# Patient Record
Sex: FEMALE | Race: WHITE
[De-identification: names, ages, dates, MRNs, and addresses within clinical notes are randomized per-mention and may not be internally consistent; named-entity substitution may affect disease eponyms.]

---

## 2017-10-21 NOTE — RAD
PORTABLE CHEST:

10/21/17

 

HISTORY: 

Chest pain and shortness of breath. 

 

Heart size and mediastinum are within normal limits. The lungs are clear of infiltrates. No bony fin
dings. 

 

IMPRESSION:  

No active intrathoracic disease. 

 

POS: [facility]

## 2017-10-23 NOTE — OP
DATE OF PROCEDURE:  10/23/2017

 

PROCEDURE:  Endoscopic retrograde cholangiopancreatography with sphincterotomy.

 

PREOPERATIVE DIAGNOSIS:  Choledocholithiasis.

 

OPERATIVE NOTE:  Informed consent was obtained from the patient.  She was sedated with general anest
hesia.  She underwent laparoscopic cholecystectomy.  Intraoperative cholangiogram showed a dilated b
ile duct that did not drain.  This procedure was performed in the same anesthesia as the cholecystec
roni.  The patient was placed in the prone position.  The side-viewing endoscope was advanced easily
 to the second portion of the duodenum.  The ampulla was identified and there was a small periampull
emily diverticulum present.  The common bile duct was cannulated easily with a sphincterotome loaded w
ith a jag wire.  Cholangiogram was performed, which showed a dilated common bile duct and mildly dil
ated extrahepatic ducts.  No focal filling defect was identified.  A large sphincterotomy was perfor
med.  A 15 mm balloon was then used to sweep the bile duct.  The balloon had to be deflated very sli
ghtly between 12 and 15 mm to pass through the sphincterotomy; however, it did pass through easily. 
 The duct was swept clear and occlusion cholangiogram confirmed the duct to be clear.  No stone was 
identified.

 

IMPRESSION:

1.  Dilated common bile duct and extrahepatic ducts.  No focal filling defect was seen.

2.  Large sphincterotomy performed.  Balloon sweep of the bile duct was done and occlusion cholangio
gram confirmed the duct to be clear.  Air was suctioned from the stomach and the patient tolerated t
he procedure well without immediate complications.  Indomethacin 100 mg was given rectally immediate
ly prior to the ERCP.

 

RECOMMENDATIONS:  Follow trend of the liver function test tomorrow.  If she is doing well without co
mplication, she should be able to discharge home tomorrow.

## 2017-10-23 NOTE — CON
DATE OF CONSULTATION:  10/23/2017

 

GASTROENTEROLOGY CONSULTATION NOTE

 

CHIEF COMPLAINT:  Abdominal pain.

 

HISTORY OF PRESENT ILLNESS:  Ms. Bedolla is a 21-year-old woman who delivered a baby 4 weeks ago who
 presented to the emergency room with abdominal pain starting the day before yesterday.  She has rig
ht upper quadrant, sharp to aching pain that radiates through to her right back associated with naus
ea and vomiting.  She came to the emergency room on 10/22/2017 and her pain resolved and she was dis
charged home.  She had more back pain at that time with nausea and vomiting.  She came back yesterda
y to the emergency room and an ultrasound was performed which showed cholelithiasis.  Her common valencia
e duct was noted to be dilated to 1 cm.  She was also noted to have elevated liver tests.  She was a
dmitted to Dr. Pino and GI was consulted to evaluate for choledocholithiasis.  Her pain actually 
resolved around 1:30 this morning; however, her liver tests increased.  She has had no fever or chil
ls.  Her white count has been normal.

 

PAST MEDICAL HISTORY:  Otherwise, negative.  She delivered a healthy baby 4 weeks ago.

 

PAST SURGICAL HISTORY:  Negative.

 

FAMILY HISTORY:  Negative for GI malignancy.  Her grandmother had breast cancer.

 

SOCIAL HISTORY:  No alcohol or drugs.  She smokes 1-2 cigarettes a day.

 

ALLERGIES:  No known drug allergies.

 

MEDICATIONS AT HOME:  None.

 

REVIEW OF SYSTEMS:  Negative x10 systems review except as stated in the history of present illness.

 

PHYSICAL EXAMINATION:

VITAL SIGNS:  Temperature 98.8, pulse 88, and blood pressure 120/84.

GENERAL:  She is in no acute distress, alert and oriented x3.

HEENT:  Eyes have no scleral icterus.  Oropharynx is clear without lesions.

NECK:  No cervical or supraclavicular lymphadenopathy.

LUNGS:  Clear to auscultation bilaterally.

HEART:  Regular rate and rhythm.

ABDOMEN:  Has mild tenderness in the right upper quadrant without guarding.

EXTREMITIES:  No lower extremity edema.

NEUROLOGIC:  Cranial nerves are grossly intact.

 

LABORATORY DATA:  White blood cell count 5.6, hemoglobin 13.2, and platelets 244.  Creatinine 0.79, 
bilirubin is 2.4, , , alkaline phosphatase 109, albumin 4.0, and lipase 24.

 

IMPRESSION:  Choledocholithiasis.  Her liver tests have increased; however, pain is improved.  Her b
ile duct is dilated and she has stones in the gallbladder by ultrasound.  Risks and benefits of endo
scopic retrograde cholangiopancreatography were discussed with the patient in detail, this includes 
post-endoscopic retrograde cholangiopancreatography and bleeding.

 

PLAN:  ERCP today.  Thus will follow laparoscopic cholecystectomy.

## 2017-10-23 NOTE — ULT
PRELIMINARY REPORT/[facility]/EMERGENCY AFTER

HOURS PROCEDURE:

 

EXAM:

US Abdomen Limited, Right Upper Quadrant

 

EXAM DATE/TIME:

Exam ordered 10/23/2017 2:55 AM

 

CLINICAL HISTORY:

21 years old, female; Pain; Other: Ruq

 

TECHNIQUE:

Real-time ultrasound of the right upper quadrant with image documentation.

 

COMPARISON:

No relevant prior studies available.

 

FINDINGS:

Liver: Unremarkable. No mass. No intrahepatic bile duct dilation.

Gallbladder: Cholelithiasis. Gallbladder is distended. Gallbladder wall is at upper limits of normal
 in thickness at 3 mm. Sonographic Ng sign reported as "slightly positive."

Common bile duct: Common bile duct is dilated at 1 cm in caliber. No stones.

Pancreas: Unremarkable as visualized.

Right kidney: Unremarkable. No stones. No solid mass. No hydronephrosis.

 

IMPRESSION:

1. Cholelithiasis. Gallbladder is distended with wall thickness at the upper limits of normal at 3 m
m, and "slightly positive" sonographic Ng's are suspicious for but not definitely diagnostic of 
acute

cholecystitis.

2. Common bile duct is dilated at 1 cm. Distal CBD obstruction not excluded. Consider CT abdomen/pel
vis with intravenous contrast.

 

Thank you for allowing us to participate in the care of your patient.

 

Dictated and Authenticated by: Jefferson Hannah MD

10/23/2017 3:30 AM Central Time ([place] \T\ Rl)

 

 

 

FINAL REPORT

 

EMERGENT AFTER HOURS STUDY

 

ULTRASOUND ABDOMEN LIMITED:

(RIGHT UPPER QUADRANT)

 

HISTORY:

A 21-year-old female with right upper quadrant abdominal pain.

 

FINDINGS:

Gallbladder:  Wall thickness at upper limits of normal, 3 mm.  Several tiny mobile gallstones, a few
 millimeters in size each.  Mildly distended lumen.

Common duct:  Dilated to 10 mm.

Liver:  Diffusely heterogeneous echotexture, suggestive of possible hepatocellular disease.

Pancreas:  Nonspecific sonographic appearance.

Right kidney:  No hydronephrosis.  Parenchyma thin, atypical for age 21 years.

 

No major disagreement with preliminary report by Reinier.

 

IMPRESSION:

1.  Dilated common duct to 10 mm caliber:  Evidence for common bile duct obstruction, such as that d
ue to occult choledocholithiasis.

 

2.  Cholelithiasis.

 

3.  Heterogeneous hepatic echotexture, atypical for stated age of 21 years.  This is suggestive of p
ossible hepatocellular disease.  Recommend correlation with liver enzymes.

 

4.  The right renal parenchyma appears thin, which is also atypical for age 21 years old.  Recommend
 correlation with urinalysis and glomerular filtration rate.

 

PATRICIA ARAGON

 

POS: [facility]

## 2017-10-23 NOTE — RAD
CHOLANGIOGRAM IN SURGERY:

 

History: Cholelithiasis. 

 

FINDINGS/IMPRESSION: 

Two spot fluoroscopic intraoperative images during a cholangiogram demonstrate opacification of the 
common duct, cystic duct, and branches without filling defects. Contrast was not seen in the distal 
common bile duct, pancreatic duct, or second portion of the duodenum. 

 

POS: [facility]

## 2017-10-24 NOTE — PRG
DATE OF SERVICE:  10/24/2017

 

SUBJECTIVE:  Ms. Bedolla still has had abdominal pain.  Her pain is different now.  She has more of 
tightness across epigastric region.  This is not as severe as when she came in, but she is still req
uiring pain medication.  She vomited once this morning, but otherwise is tolerating clear liquids.  
She is ambulatory.

 

PHYSICAL EXAMINATION:

VITAL SIGNS:  Temperature 98.5, pulse 80, and blood pressure 155/84.

GENERAL:  She is in no acute distress, alert and oriented x3.

LUNGS:  Clear to auscultation bilaterally.

HEART:  Regular rate and rhythm without murmur.

ABDOMEN:  Soft, mild tenderness in the epigastric region without guarding.  Bowel sounds are present
.

EXTREMITIES:  No lower extremity edema.

 

LABORATORY DATA:  White blood cell count 10.6, hemoglobin 13.7, platelets 233, creatinine 0.79, bili
hatch 0.9, , , and alkaline phosphatase 106.

 

IMPRESSION:

1.  Choledocholithiasis/cholecystitis, status post cholecystectomy and endoscopic retrograde cholang
iopancreatography with sphincterotomy.  There is no ongoing retained stone at the time of the endosc
opic retrograde cholangiopancreatography.  The ducts were significantly dilated and large sphinctero
roni was performed and the duct was swept and confirmed to be clear.

2.  Epigastric pain.  She might have a mild post-endoscopic retrograde cholangiopancreatography panc
reatitis now.

 

RECOMMENDATIONS:

1.  Continue a clear liquid diet.

2.  Check lipase.

3.  She can likely advance her diet and discharge home tomorrow if she does have pancreatitis, it ap
pears to be very mild.

## 2017-10-24 NOTE — RAD
ERCP:

 

HISTORY:  

Recent cholecystectomy. 

 

FINDINGS/IMPRESSION: 

Four spot fluoroscopic intraoperative images from an ERCP demonstrate opacification of the common bi
le duct, hepatic ducts, and some of the branches. No persistent filling defects are seen. The patien
t is post cholecystectomy. There is some contrast in the cystic duct remnant and the second portion 
of the duodenum. 

 

 

POS: [facility]

## 2017-10-25 NOTE — PRG
DATE OF SERVICE:  10/25/2017

 

SUBJECTIVE:  Ms. Bedolla is feeling better today.  Her abdominal pain has diminished.  She is not na
useated.  She has not tried eating today.  Her labs are still pending.  Her vital signs are normal.

 

ASSESSMENT:  Cholecystitis and choledocholithiasis status post laparoscopic cholecystectomy and endo
scopic retrograde cholangiopancreatography.  She had a mild post-endoscopic retrograde cholangiopanc
reatography pancreatitis, which appears to be resolving.  Her nurses to call me with her labs when t
hey have return and if these are normal we will likely advance her diet.  I anticipate that she will
 be discharged home today.

## 2017-10-26 NOTE — PDOC.OP
Operative Note





- Operative Note


Operative Note: 





PROCEDURE: Laparoscopic cholecystectomy with intraoperative cholangiogram





SURGEON: Cj Pino M.D.





DATE OF PROCEDURE: 10/23/2017





PREOPERATIVE DIAGNOSIS: Cholelithiasis and cholecystitis, possible 

choledocholithiasis:





POSTOPERATIVE DIAGNOSIS: Cholelithiasis and cholecystitis, choledocholithiasis





HISTORY: Patient who is 4 weeks postpartum who presented to the hospital with 

biliary colic symptoms. She was found to have a common bile duct that was 

dilated and elevated LFTs. After discussion with gastroenterology, the 

recommendation was made to proceed with laparoscopic cholecystectomy and 

intraoperative cholangiogram, with immediate postoperative ERCP under the same 

anesthesia if the cholangiogram is abnormal.





FINDINGS: Distended thin walled gallbladder with extensive omental adhesions. 

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 

which was spared except for a few small branches to the gallbladder itself.





PROCEDURE IN DETAIL:  After informed consent was obtained and appropriate 

preoperative antibiotics were administered, the patient was taken to the 

operating room and placed in the supine position and general endotracheal 

anesthesia was administered.  The stomach was decompressed with an OG tube and 

the abdomen was prepped and draped in standard sterile fashion.  Local 

anesthesia was infused to the skin and subcutaneous tissues at the umbilical 

level.  A transverse skin incision was made.  The fascia was elevated and a 

Veress needle was placed into the abdominal cavity without difficulty.  Opening 

pressure was less than 5 and carbon dioxide gas easily insufflated to an intra-

abdominal pressure of 15, which the patient tolerated well.  The Veress needle 

was withdrawn and a [place] port advanced under direct vision. The abdominal 

cavity was carefully examined.  There was no evidence of Veress needle or of 

trocar injury.  Local anesthesia was infused to the skin and subcutaneous 

tissues at the epigastric, right upper quadrant, and right lateral abdominal 

sites and trocars were placed under direct vision of the laparoscope.  The 

fundus of the gallbladder was grasped and retracted superiorly. The patient had 

extensive omental adhesions covering the entire anterior surface of her 

gallbladder which appeared very chronic. These were stripped inferiorly using 

electrocautery as necessary until the infundibulum was identified. The 

infundibulum was grasped and retracted laterally.  The serosa was stripped 

inferiorly at the level of the neck of the gallbladder exposing the cystic duct 

which was traced clearly to their insertion in the gallbladder. This was 

dissected free circumferentially and the cystic duct was clipped at the level 

of the neck of the gallbladder. An incision was made in the cystic duct 

inferior to the clip and the cystic duct was palpated with no stones palpable. 

Clear bile was seen to flow from the cystic duct incision. A cholangiogram 

catheter was introduced and placed into the cystic duct and secured with a 

clip. A cholangiogram was obtained which showed an adequate length of cystic 

duct. There was normal filling of the proximal common bile duct with no flow of 

contrast into the duodenum.  There was normal retrograde flow into the common 

hepatic duct beyond the level of the bifurcation without filling defects. 

Glucagon was administered and the cholangiogram repeated, with continued 

obstruction of the distal common bile duct seen. Dr. Fernandez of gastroenterology 

was contacted and plans made for an immediate postoperative ERCP. The 

cholangiogram catheter was removed and the cystic duct clipped below the 

incision in the cystic duct. The cystic duct was divided between these clips 

and the previously placed clip. The cystic artery was noted to be unusually 

large and was not inserting directly into the gallbladder. This was dissected 

free from the gallbladder wall and ran adjacent to the gallbladder wall was 

felt to possibly represent an aberrant right hepatic artery. A branch coming 

off of this artery heading toward the gallbladder was identified. This gave off 

2 small branches which were clipped and divided. The remainder of the artery 

was dissected free of the gallbladder and spared. The gallbladder was then 

dissected free of the gallbladder bed using hook electrocautery.  Prior to 

complete removal of the gallbladder from the gallbladder bed, the area of the 

cystic duct and artery stumps was examined.  The clips were in good position 

completely across these structures and there was no bleeding and no leakage of 

bile. The gallbladder was then placed into an EndoCatch bag and drawn out 

through the epigastric incision.  The epigastric trocar was replaced and the 

operative site easily irrigated to clear. There was no significant bleeding or 

spillage of bile. The epigastric trocar was removed and the fascia closed under 

direct laparoscopic vision with a 0 Vicryl suture on a GraNee needle in a figure

-of-eight manner with excellent technical result.  The right upper quadrant and 

right lateral abdominal trocars were removed and hemostasis verified.  Carbon 

dioxide gas was allowed to desufflate through the umbilical trocar which was 

then removed. The skin incisions were closed with 4-0 subcuticular Monocryl 

sutures and Dermabond  dressings were placed.   The patient was taken to the 

endoscopy suite in good condition.  There were no complications. 


 


ESTIMATED BLOOD LOSS: Minimal.


  


SPECIMEN : Gallbladder and contents.

## 2022-05-16 ENCOUNTER — HOSPITAL ENCOUNTER (OUTPATIENT)
Dept: HOSPITAL 92 - LABBT | Age: 27
Discharge: HOME | End: 2022-05-16
Attending: UROLOGY
Payer: COMMERCIAL

## 2022-05-16 DIAGNOSIS — Z20.822: ICD-10-CM

## 2022-05-16 DIAGNOSIS — N20.1: Primary | ICD-10-CM

## 2022-05-16 PROCEDURE — U0003 INFECTIOUS AGENT DETECTION BY NUCLEIC ACID (DNA OR RNA); SEVERE ACUTE RESPIRATORY SYNDROME CORONAVIRUS 2 (SARS-COV-2) (CORONAVIRUS DISEASE [COVID-19]), AMPLIFIED PROBE TECHNIQUE, MAKING USE OF HIGH THROUGHPUT TECHNOLOGIES AS DESCRIBED BY CMS-2020-01-R: HCPCS

## 2022-05-16 PROCEDURE — U0005 INFEC AGEN DETEC AMPLI PROBE: HCPCS

## 2022-05-17 ENCOUNTER — HOSPITAL ENCOUNTER (OUTPATIENT)
Dept: HOSPITAL 92 - SDC | Age: 27
Discharge: HOME | End: 2022-05-17
Attending: UROLOGY
Payer: COMMERCIAL

## 2022-05-17 VITALS — BODY MASS INDEX: 40.1 KG/M2

## 2022-05-17 DIAGNOSIS — Z79.899: ICD-10-CM

## 2022-05-17 DIAGNOSIS — F17.210: ICD-10-CM

## 2022-05-17 DIAGNOSIS — N39.3: ICD-10-CM

## 2022-05-17 DIAGNOSIS — N13.2: Primary | ICD-10-CM

## 2022-05-17 DIAGNOSIS — N81.10: ICD-10-CM

## 2022-05-17 PROCEDURE — 88300 SURGICAL PATH GROSS: CPT

## 2022-05-17 PROCEDURE — 0TC68ZZ EXTIRPATION OF MATTER FROM RIGHT URETER, VIA NATURAL OR ARTIFICIAL OPENING ENDOSCOPIC: ICD-10-PCS | Performed by: UROLOGY

## 2022-05-17 PROCEDURE — C2617 STENT, NON-COR, TEM W/O DEL: HCPCS

## 2022-05-17 PROCEDURE — 74420 UROGRAPHY RTRGR +-KUB: CPT

## 2022-05-17 PROCEDURE — 82365 CALCULUS SPECTROSCOPY: CPT

## 2022-05-17 PROCEDURE — 0T768DZ DILATION OF RIGHT URETER WITH INTRALUMINAL DEVICE, VIA NATURAL OR ARTIFICIAL OPENING ENDOSCOPIC: ICD-10-PCS | Performed by: UROLOGY

## 2022-07-05 ENCOUNTER — HOSPITAL ENCOUNTER (OUTPATIENT)
Dept: HOSPITAL 92 - BICULT | Age: 27
Discharge: HOME | End: 2022-07-05
Attending: UROLOGY
Payer: COMMERCIAL

## 2022-07-05 DIAGNOSIS — N20.1: Primary | ICD-10-CM

## 2022-07-05 PROCEDURE — 76770 US EXAM ABDO BACK WALL COMP: CPT
